# Patient Record
Sex: MALE | Race: WHITE | NOT HISPANIC OR LATINO | Employment: OTHER | URBAN - METROPOLITAN AREA
[De-identification: names, ages, dates, MRNs, and addresses within clinical notes are randomized per-mention and may not be internally consistent; named-entity substitution may affect disease eponyms.]

---

## 2019-04-22 NOTE — PATIENT DISCUSSION
Recommend 55units of Xeomin. Patient elects Xeomin injection. 45units discarded, * units used. (discussed risks and benefits Xeomin. .. ).

## 2019-05-02 NOTE — PATIENT DISCUSSION
This visual field clearly demonstrated a minimum of 49% loss of upper field of vision OU, with upper lid skin in repose and elevated by taping of the lid to demonstrate potential correction. This field shows that taping the lids significantly improved this patient's superior field of vision by approximately 40%, OU.

## 2019-06-17 NOTE — PROCEDURE NOTE: SURGICAL
"<div><p style=""tab-stops:-1.0in;""><strong>PREOPERATIVE DIAGNOSIS:</strong> <span>&nbsp;&nbsp;&nbsp;&nbsp;&nbsp;&nbsp;&nbsp;&nbsp;&nbsp;&nbsp;&nbsp;&nbsp;&nbsp; </span>1. Facial jowls. </p><p><span>&nbsp;&nbsp;&nbsp;&nbsp;&nbsp;&nbsp;&nbsp;&nbsp;&nbsp;&nbsp;&nbsp; </span><span>&nbsp;&nbsp;&nbsp;&nbsp;&nbsp;&nbsp;&nbsp;&nbsp;&nbsp;&nbsp;&nbsp;&nbsp;&nbsp;&nbsp;&nbsp;&nbsp;&nbsp;&nbsp;&nbsp;&nbsp;&nbsp;&nbsp;&nbsp;&nbsp;&nbsp;&nbsp;&nbsp;&nbsp;&nbsp;&nbsp;&nbsp;&nbsp;&nbsp;&nbsp;&nbsp;&nbsp;&nbsp;&nbsp;&nbsp;&nbsp;&nbsp;&nbsp;&nbsp;&nbsp;&nbsp;&nbsp;&nbsp;&nbsp;&nbsp;&nbsp;&nbsp;&nbsp;&nbsp; </span><span>&nbsp;&nbsp;&nbsp;&nbsp;&nbsp; </span>2. Facial rhytids. </p><p><span>&nbsp;&nbsp;&nbsp;&nbsp;&nbsp;&nbsp;&nbsp;&nbsp;&nbsp;&nbsp;&nbsp;&nbsp;&nbsp;&nbsp;&nbsp;&nbsp;&nbsp;&nbsp;&nbsp;&nbsp;&nbsp;&nbsp;&nbsp;&nbsp;&nbsp;&nbsp;&nbsp;&nbsp;&nbsp;&nbsp;&nbsp;&nbsp;&nbsp;&nbsp;&nbsp;&nbsp;&nbsp;&nbsp;&nbsp;&nbsp;&nbsp;&nbsp;&nbsp;&nbsp;&nbsp;&nbsp;&nbsp;&nbsp;&nbsp;&nbsp;&nbsp;&nbsp;&nbsp;&nbsp;&nbsp;&nbsp;&nbsp;&nbsp;&nbsp;&nbsp;&nbsp;&nbsp;&nbsp;&nbsp;&nbsp;&nbsp;&nbsp;&nbsp;&nbsp;&nbsp;&nbsp; </span>3. Platysmal bands</p><p><span>&nbsp;&nbsp;&nbsp;&nbsp;&nbsp;&nbsp;&nbsp;&nbsp;&nbsp;&nbsp;&nbsp;&nbsp;&nbsp;&nbsp;&nbsp;&nbsp;&nbsp;&nbsp;&nbsp;&nbsp;&nbsp;&nbsp;&nbsp;&nbsp;&nbsp;&nbsp;&nbsp;&nbsp;&nbsp;&nbsp;&nbsp;&nbsp;&nbsp;&nbsp;&nbsp;&nbsp;&nbsp;&nbsp;&nbsp;&nbsp;&nbsp;&nbsp;&nbsp;&nbsp;&nbsp;&nbsp;&nbsp;&nbsp;&nbsp;&nbsp;&nbsp;&nbsp;&nbsp;&nbsp;&nbsp;&nbsp;&nbsp;&nbsp;&nbsp;&nbsp;&nbsp;&nbsp;&nbsp;&nbsp;&nbsp;&nbsp;&nbsp;&nbsp;&nbsp;&nbsp;&nbsp; </span>4. Volume loss to cheeks</p><p><span>&nbsp;&nbsp;&nbsp;&nbsp;&nbsp;&nbsp;&nbsp;&nbsp;&nbsp;&nbsp;&nbsp;&nbsp;&nbsp;&nbsp;&nbsp;&nbsp;&nbsp;&nbsp;&nbsp;&nbsp;&nbsp;&nbsp;&nbsp;&nbsp;&nbsp;&nbsp;&nbsp;&nbsp;&nbsp;&nbsp;&nbsp;&nbsp;&nbsp;&nbsp;&nbsp;&nbsp;&nbsp;&nbsp;&nbsp;&nbsp;&nbsp;&nbsp;&nbsp;&nbsp;&nbsp;&nbsp;&nbsp;&nbsp;&nbsp;&nbsp;&nbsp;&nbsp;&nbsp;&nbsp;&nbsp;&nbsp;&nbsp;&nbsp;&nbsp;&nbsp;&nbsp;&nbsp;&nbsp;&nbsp;&nbsp;&nbsp;&nbsp;&nbsp;&nbsp;&nbsp;&nbsp; </span>5. Thinning lips with rhytids. </p><p><span>&nbsp;&nbsp;&nbsp;&nbsp;&nbsp;&nbsp;&nbsp;&nbsp;&nbsp;&nbsp;&nbsp;&nbsp;&nbsp;&nbsp;&nbsp;&nbsp;&nbsp;&nbsp;&nbsp;&nbsp;&nbsp;&nbsp;&nbsp;&nbsp;&nbsp;&nbsp;&nbsp;&nbsp;&nbsp;&nbsp;&nbsp;&nbsp;&nbsp;&nbsp;&nbsp;&nbsp;&nbsp;&nbsp;&nbsp;&nbsp;&nbsp;&nbsp;&nbsp;&nbsp;&nbsp;&nbsp;&nbsp;&nbsp;&nbsp;&nbsp;&nbsp;&nbsp;&nbsp;&nbsp;&nbsp;&nbsp;&nbsp;&nbsp;&nbsp;&nbsp;&nbsp;&nbsp;&nbsp;&nbsp;&nbsp;&nbsp;&nbsp;&nbsp;&nbsp;&nbsp;&nbsp; </span>6. Dermatochalasis upper lids. </p><p>&nbsp;</p><p><span>&nbsp;&nbsp;&nbsp;&nbsp;&nbsp;&nbsp;&nbsp;&nbsp;&nbsp;&nbsp;&nbsp;&nbsp;&nbsp;&nbsp;&nbsp;&nbsp;&nbsp;&nbsp;&nbsp;&nbsp;&nbsp;&nbsp;&nbsp;&nbsp;&nbsp;&nbsp;&nbsp;&nbsp;&nbsp;&nbsp;&nbsp;&nbsp;&nbsp;&nbsp;&nbsp;&nbsp;&nbsp;&nbsp;&nbsp;&nbsp;&nbsp;&nbsp;&nbsp;&nbsp;&nbsp;&nbsp;&nbsp;&nbsp;&nbsp;&nbsp;&nbsp;&nbsp;&nbsp;&nbsp;&nbsp;&nbsp;&nbsp;&nbsp;&nbsp;&nbsp;&nbsp;&nbsp;&nbsp;&nbsp;&nbsp;&nbsp;&nbsp;&nbsp;&nbsp;&nbsp;&nbsp; </span></p><p><span>&nbsp;&nbsp;&nbsp;&nbsp;&nbsp;&nbsp;&nbsp;&nbsp;&nbsp;&nbsp;&nbsp;&nbsp;&nbsp;&nbsp;&nbsp;&nbsp;&nbsp;&nbsp;&nbsp;&nbsp;&nbsp;&nbsp;&nbsp;&nbsp;&nbsp;&nbsp;&nbsp;&nbsp;&nbsp;&nbsp;&nbsp;&nbsp;&nbsp;&nbsp;&nbsp;&nbsp;&nbsp;&nbsp;&nbsp;&nbsp;&nbsp;&nbsp;&nbsp;&nbsp;&nbsp;&nbsp;&nbsp;&nbsp;&nbsp;&nbsp;&nbsp;&nbsp;&nbsp;&nbsp;&nbsp;&nbsp;&nbsp;&nbsp;&nbsp;&nbsp;&nbsp;&nbsp;&nbsp;&nbsp;&nbsp;&nbsp;&nbsp;&nbsp;&nbsp;&nbsp;&nbsp; </span></p><p><strong>POSTOPERATIVE DIAGNOSIS</strong>: <span>&nbsp;&nbsp;&nbsp;&nbsp;&nbsp;&nbsp;&nbsp;&nbsp;&nbsp;&nbsp;&nbsp;&nbsp;&nbsp;&nbsp;&nbsp;&nbsp;&nbsp;&nbsp;&nbsp;&nbsp;&nbsp;&nbsp;&nbsp; </span><span>&nbsp;</span>Same<span>&nbsp;&nbsp;&nbsp;&nbsp;&nbsp;&nbsp;&nbsp;&nbsp;&nbsp;&nbsp;&nbsp;&nbsp;&nbsp;&nbsp;&nbsp;&nbsp;&nbsp;&nbsp;&nbsp;&nbsp;&nbsp;&nbsp;&nbsp;&nbsp;&nbsp;&nbsp;&nbsp;&nbsp;&nbsp;&nbsp;&nbsp;&nbsp;&nbsp;&nbsp;&nbsp;&nbsp;&nbsp;&nbsp;&nbsp;&nbsp;&nbsp;&nbsp;&nbsp;&nbsp;&nbsp;&nbsp;&nbsp;&nbsp;&nbsp;&nbsp;&nbsp;&nbsp;&nbsp;&nbsp;&nbsp;&nbsp;&nbsp;&nbsp;&nbsp;&nbsp;&nbsp; </span></p><p>&nbsp;</p><p style=""tab-stops:-1.0in;""><strong>PROCEDURE:</strong> <span>&nbsp;&nbsp;&nbsp;&nbsp;&nbsp;&nbsp;&nbsp;&nbsp;&nbsp;&nbsp;&nbsp;&nbsp;&nbsp;&nbsp;&nbsp;&nbsp;&nbsp;&nbsp;&nbsp;&nbsp;&nbsp;&nbsp;&nbsp;&nbsp;&nbsp;&nbsp;&nbsp;&nbsp;&nbsp;&nbsp;&nbsp;&nbsp;&nbsp;&nbsp;&nbsp;&nbsp;&nbsp;&nbsp;&nbsp;&nbsp;&nbsp;&nbsp;&nbsp; </span>1. Lower Facelift with Deep Plane sub- SMAS </p><p style=""tab-stops:-1.0in;""><span>&nbsp;&nbsp;&nbsp;&nbsp;&nbsp;&nbsp;&nbsp;&nbsp;&nbsp;&nbsp;&nbsp;&nbsp;&nbsp;&nbsp;&nbsp;&nbsp;&nbsp;&nbsp;&nbsp;&nbsp;&nbsp;&nbsp;&nbsp;&nbsp;&nbsp;&nbsp;&nbsp;&nbsp;&nbsp;&nbsp;&nbsp;&nbsp;&nbsp;&nbsp;&nbsp;&nbsp;&nbsp;&nbsp;&nbsp;&nbsp;&nbsp;&nbsp;&nbsp;&nbsp;&nbsp;&nbsp;&nbsp;&nbsp;&nbsp;&nbsp;&nbsp;&nbsp;&nbsp;&nbsp;&nbsp;&nbsp;&nbsp;&nbsp;&nbsp;&nbsp;&nbsp;&nbsp;&nbsp;&nbsp;&nbsp;&nbsp;&nbsp;&nbsp;&nbsp;&nbsp;&nbsp; </span><span>&nbsp;&nbsp; </span>approach. </p><p style=""tab-stops:-1.0in;""><span>&nbsp;&nbsp;&nbsp;&nbsp;&nbsp;&nbsp;&nbsp;&nbsp;&nbsp;&nbsp;&nbsp;&nbsp;&nbsp;&nbsp;&nbsp;&nbsp;&nbsp;&nbsp;&nbsp;&nbsp;&nbsp;&nbsp;&nbsp;&nbsp;&nbsp;&nbsp;&nbsp;&nbsp;&nbsp;&nbsp;&nbsp;&nbsp;&nbsp;&nbsp;&nbsp;&nbsp;&nbsp;&nbsp;&nbsp;&nbsp;&nbsp;&nbsp;&nbsp;&nbsp;&nbsp;&nbsp;&nbsp;&nbsp;&nbsp;&nbsp;&nbsp;&nbsp;&nbsp;&nbsp;&nbsp;&nbsp;&nbsp;&nbsp;&nbsp;&nbsp;&nbsp;&nbsp;&nbsp;&nbsp;&nbsp;&nbsp;&nbsp;&nbsp;&nbsp;&nbsp;&nbsp; </span>2. Platysmaplasty. </p><p style=""margin-left:3.0in;tab-stops:-1.0in;"">3. 1cc of Volbella injection to lips</p><p style=""margin-left:3.0in;tab-stops:-1.0in;"">4. 2cc&rsquo;s of Juvederm ultra injection to lips</p><p style=""margin-left:3.0in;tab-stops:-1.0in;"">5. 2cc&rsquo;s of Voluma injection to cheeks</p><p style=""margin-left:3.0in;tab-stops:-1.0in;"">6.  Upper lid blepharoplasty both eyes</p><p style=""margin-left:3.0in;tab-stops:-1.0in;"">&nbsp;</p><p style=""tab-stops:-1.0in;""><span>&nbsp;&nbsp;&nbsp;&nbsp;&nbsp;&nbsp;&nbsp;&nbsp;&nbsp;&nbsp;&nbsp;&nbsp;&nbsp;&nbsp;&nbsp;&nbsp;&nbsp;&nbsp;&nbsp;&nbsp;&nbsp;&nbsp;&nbsp;&nbsp;&nbsp;&nbsp;&nbsp;&nbsp;&nbsp;&nbsp;&nbsp;&nbsp;&nbsp;&nbsp;&nbsp;&nbsp;&nbsp;&nbsp;&nbsp;&nbsp;&nbsp;&nbsp;&nbsp;&nbsp;&nbsp;&nbsp;&nbsp;&nbsp;&nbsp;&nbsp;&nbsp;&nbsp;&nbsp;&nbsp;&nbsp;&nbsp;&nbsp;&nbsp;&nbsp;&nbsp;&nbsp;&nbsp;&nbsp;&nbsp;&nbsp;&nbsp;&nbsp;&nbsp;&nbsp;&nbsp;&nbsp;&nbsp;&nbsp;&nbsp;&nbsp;&nbsp;&nbsp;&nbsp;&nbsp;&nbsp;&nbsp;&nbsp;&nbsp;&nbsp;&nbsp;&nbsp;&nbsp;&nbsp;&nbsp;&nbsp;&nbsp;&nbsp;&nbsp;&nbsp;&nbsp;&nbsp;&nbsp;&nbsp;&nbsp;&nbsp;&nbsp;&nbsp;&nbsp;&nbsp;&nbsp;&nbsp;&nbsp;&nbsp;&nbsp;&nbsp;&nbsp;&nbsp;&nbsp;&nbsp;&nbsp;&nbsp;&nbsp;&nbsp;&nbsp;&nbsp;&nbsp;&nbsp;&nbsp;&nbsp;&nbsp;&nbsp;&nbsp;&nbsp;&nbsp;&nbsp;&nbsp;&nbsp;&nbsp;&nbsp;&nbsp;&nbsp;&nbsp;&nbsp;&nbsp;&nbsp;&nbsp;&nbsp;&nbsp; </span></p><p style=""margin-left:5.0in;text-indent:-5.0in;tab-stops:-1.0in;""><strong>SURGEON:</strong><span>&nbsp;&nbsp;&nbsp;&nbsp;&nbsp;&nbsp;&nbsp;&nbsp;&nbsp;&nbsp;&nbsp;&nbsp;&nbsp;&nbsp;&nbsp;&nbsp;&nbsp;&nbsp;&nbsp;&nbsp;&nbsp;&nbsp;&nbsp;&nbsp;&nbsp;&nbsp;&nbsp;&nbsp;&nbsp;&nbsp;&nbsp;&nbsp;&nbsp;&nbsp;&nbsp;&nbsp;&nbsp;&nbsp;&nbsp;&nbsp;&nbsp;&nbsp;&nbsp;&nbsp;&nbsp;&nbsp;&nbsp;&nbsp;&nbsp;&nbsp; </span>Librado Benz

## 2019-06-24 NOTE — PATIENT DISCUSSION
skinuva Tizo, pt wants to leave [facility] gave pt instructions needs to see a Dr to get 6 interupted sutures out. Dr David Johnson or Dr Valorie Ryan.

## 2019-06-27 NOTE — PATIENT DISCUSSION
skinuva Tizo, pt wants to leave town gave pt instructions needs to see a Dr to get 6 interupted sutures out. Dr Yessenia Ty or Dr Zuly Weldon.

## 2019-06-27 NOTE — PATIENT DISCUSSION
Small area of dehiscence behind right ear. This area will heal on its own. There is no need to suture this area today.

## 2019-07-01 NOTE — PATIENT DISCUSSION
script of bactraban TID apply to wounds written today 7/1/19and amoxicillin bid x 2 weeks start today 7/1/19.

## 2019-07-01 NOTE — PATIENT DISCUSSION
skinuva Tizo, pt wants to leave [facility] gave pt instructions needs to see a Dr to get 6 interupted sutures out. Dr Nesha Cox or Dr Gwendolyn Cruz.

## 2019-10-16 NOTE — PATIENT DISCUSSION
skinuva Tizo, pt wants to leave town gave pt instructions needs to see a Dr to get 6 interupted sutures out. Dr Isaiah Gross or Dr Carola Mari.

## 2019-10-16 NOTE — PATIENT DISCUSSION
Recommend 55 units of Xeomin. Patient elects Xeomin injection. 45units discarded, 55 units used. (discussed risks and benefits Xeomin. ..). Lot # [id number], exp. 09/2021.

## 2019-12-11 NOTE — PATIENT DISCUSSION
Recommend 55 units of Xeomin. Patient elects Xeomin injection. 45units discarded, 55 units used. (discussed risks and benefits Xeomin. ..)[facility]#[id number] Exp: 09/21.

## 2019-12-11 NOTE — PATIENT DISCUSSION
Recommend 55 units of Xeomin. Patient elects Xeomin injection. 45units discarded, 55 units used. (discussed risks and benefits Xeomin. ..). Lot # [id number], exp. 09/2021.

## 2019-12-18 NOTE — PATIENT DISCUSSION
skinuva Tizo, pt wants to leave [facility] gave pt instructions needs to see a Dr to get 6 interupted sutures out. Dr Hans Mcfadden or Dr Mark Ibrahim.

## 2019-12-18 NOTE — PATIENT DISCUSSION
Recommend 55 units of Xeomin. Patient elects Xeomin injection. 45units discarded, 55 units used. (discussed risks and benefits Xeomin. ..). Lot # [id number], exp. 09/2021.

## 2019-12-18 NOTE — PATIENT DISCUSSION
Recommend 5 units of Botox. Patient elects Botox injection. 95units discarded, 5 units used. (discussed risks and benefits of BOTOX. ..) N/C per [facility].

## 2019-12-18 NOTE — PATIENT DISCUSSION
Recommend 55 units of Xeomin. Patient elects Xeomin injection. 45units discarded, 55 units used. (discussed risks and benefits Xeomin. ..)PMD#[id number] Exp: 09/21.

## 2020-05-13 NOTE — PATIENT DISCUSSION
skinuva Tizo, pt wants to leave town gave pt instructions needs to see a Dr to get 6 interupted sutures out. Dr Gideon Garcia or Dr Ban Avery.

## 2020-05-13 NOTE — PATIENT DISCUSSION
Recommend 50 units of Botox. Patient elects Botox injection. 50units discarded, 50 units used. (discussed risks and benefits of BOTOX. ..) [id number] 8/22 injected 5/13/2020.

## 2020-05-13 NOTE — PATIENT DISCUSSION
Recommend 55 units of Xeomin. Patient elects Xeomin injection. 45units discarded, 55 units used. (discussed risks and benefits Xeomin. ..). Lot # [id number], exp. 09/2021.

## 2020-05-26 NOTE — PATIENT DISCUSSION
Recommend 55 units of Xeomin. Patient elects Xeomin injection. 45units discarded, 55 units used. (discussed risks and benefits Xeomin. ..). Lot # [id number], exp. 09/2021.

## 2020-05-26 NOTE — PATIENT DISCUSSION
Minimal risk for malignant transformation discussed with patient. Recommend to observe and follow with serial exams.  photo taken today.

## 2020-05-26 NOTE — PATIENT DISCUSSION
Recommend 50 units of Botox. Patient elects Botox injection. 50units discarded, 50 units used. (discussed risks and benefits of BOTOX. ..) [id number] 8/22 injected 5/13/2020.

## 2020-05-26 NOTE — PATIENT DISCUSSION
Recommend 55 units of Xeomin. Patient elects Xeomin injection. 45units discarded, 55 units used. (discussed risks and benefits Xeomin. ..). Lot # [id number], exp. 09/2021.

## 2020-05-26 NOTE — PATIENT DISCUSSION
Recommend 50 units of Botox. Patient elects Botox injection. 50units discarded, 50 units used. (discussed risks and benefits of BOTOX. ..) [id number] 8/22 injected S/P 5/13/2020.

## 2020-05-26 NOTE — PATIENT DISCUSSION
ed CTL fit will do I and R refresher course at dispense.  ed CTL vision will not be equal to glasses.

## 2020-05-26 NOTE — PATIENT DISCUSSION
skinuva Tizo, pt wants to leave town gave pt instructions needs to see a Dr to get 6 interupted sutures out. Dr Brian Salas or Dr Elva Pinto.

## 2020-05-26 NOTE — PATIENT DISCUSSION
skinuva Tizo, pt wants to leave [facility] gave pt instructions needs to see a Dr to get 6 interupted sutures out. Dr Julia Latif or Dr Tal Pablo.

## 2020-11-04 ENCOUNTER — NEW PATIENT COMPREHENSIVE (OUTPATIENT)
Dept: URBAN - METROPOLITAN AREA CLINIC 36 | Facility: CLINIC | Age: 65
End: 2020-11-04

## 2020-11-04 DIAGNOSIS — H25.812: ICD-10-CM

## 2020-11-04 DIAGNOSIS — H25.811: ICD-10-CM

## 2020-11-04 DIAGNOSIS — H52.7: ICD-10-CM

## 2020-11-04 PROCEDURE — 92015 DETERMINE REFRACTIVE STATE: CPT

## 2020-11-04 PROCEDURE — 92004 COMPRE OPH EXAM NEW PT 1/>: CPT

## 2020-11-04 ASSESSMENT — TONOMETRY
OS_IOP_MMHG: 12
OD_IOP_MMHG: 12

## 2020-11-04 ASSESSMENT — VISUAL ACUITY
OS_SC: 20/40-2
OS_SC: J12
OD_SC: J12
OD_PH: 20/30-2
OS_PH: 20/40+2
OD_SC: 20/40+1

## 2021-11-01 ENCOUNTER — ESTABLISHED COMPREHENSIVE EXAM (OUTPATIENT)
Dept: URBAN - METROPOLITAN AREA CLINIC 36 | Facility: CLINIC | Age: 66
End: 2021-11-01

## 2021-11-01 DIAGNOSIS — H25.811: ICD-10-CM

## 2021-11-01 DIAGNOSIS — H52.7: ICD-10-CM

## 2021-11-01 DIAGNOSIS — H25.812: ICD-10-CM

## 2021-11-01 PROCEDURE — 92014 COMPRE OPH EXAM EST PT 1/>: CPT

## 2021-11-01 PROCEDURE — 92015 DETERMINE REFRACTIVE STATE: CPT

## 2021-11-01 ASSESSMENT — TONOMETRY
OD_IOP_MMHG: 14
OS_IOP_MMHG: 15

## 2021-11-01 ASSESSMENT — VISUAL ACUITY
OD_SC: 20/25
OS_CC: J1+
OD_CC: J1+
OD_CC: 20/20-1
OD_SC: J3
OS_SC: J2
OS_SC: 20/25
OS_CC: 20/20-1

## 2021-11-10 NOTE — PATIENT DISCUSSION
Recommend 55 units of Xeomin. Patient elects Xeomin injection. 45units discarded, 55 units used. (discussed risks and benefits Xeomin. ..). Lot # [id number], exp. 09/2021. 98.4

## 2021-11-10 NOTE — PATIENT DISCUSSION
injected 11/10/21 Recommend 64 units of Botox. Patient elects Botox injection. 36units discarded, 64 units used. (discussed risks and benefits of BOTOX. ..) lot [id number], ext 1/2024.  injected 11/10/21 pt is a long standing pt with [facility] Per [facility] $50.00 off today.

## 2021-11-10 NOTE — PATIENT DISCUSSION
[facility]PF could go back to OR and N/C  conservative scar removal. Chances pt might have same reaction to hypopigmentation. pt defers for now.

## 2022-04-12 ENCOUNTER — EMERGENCY VISIT (OUTPATIENT)
Dept: URBAN - METROPOLITAN AREA CLINIC 47 | Facility: CLINIC | Age: 67
End: 2022-04-12

## 2022-04-12 DIAGNOSIS — H25.813: ICD-10-CM

## 2022-04-12 DIAGNOSIS — H43.393: ICD-10-CM

## 2022-04-12 PROCEDURE — 92012 INTRM OPH EXAM EST PATIENT: CPT

## 2022-04-12 ASSESSMENT — TONOMETRY
OD_IOP_MMHG: 14
OS_IOP_MMHG: 14

## 2022-04-12 ASSESSMENT — VISUAL ACUITY
OD_SC: 20/20
OS_SC: 20/20-2

## 2022-04-12 NOTE — PATIENT DISCUSSION
Recommend Bilateral upper lid blepharoplasty. predeterm(discussed risks and benefits of sx. .. ). no

## 2022-11-09 ENCOUNTER — COMPREHENSIVE EXAM (OUTPATIENT)
Dept: URBAN - METROPOLITAN AREA CLINIC 36 | Facility: CLINIC | Age: 67
End: 2022-11-09

## 2022-11-09 DIAGNOSIS — H52.7: ICD-10-CM

## 2022-11-09 DIAGNOSIS — H25.813: ICD-10-CM

## 2022-11-09 PROCEDURE — 92014 COMPRE OPH EXAM EST PT 1/>: CPT

## 2022-11-09 PROCEDURE — 92015 DETERMINE REFRACTIVE STATE: CPT

## 2022-11-09 ASSESSMENT — VISUAL ACUITY
OD_SC: 20/25-1
OS_SC: 20/30-1
OS_SC: J3
OD_SC: J5

## 2022-11-09 ASSESSMENT — TONOMETRY
OS_IOP_MMHG: 14
OD_IOP_MMHG: 12

## 2022-12-23 NOTE — PATIENT DISCUSSION
Patient transported to [address], RN  12/23/22 0209 Recommend  Vobella 1cc and Juvederm  Ultra 2cc (discussed risks and benefits. .. ).

## 2023-06-18 NOTE — PATIENT DISCUSSION
This visual field clearly demonstrated a minimum of 49% loss of upper field of vision OU, with upper lid skin in repose and elevated by taping of the lid to demonstrate potential correction. This field shows that taping the lids significantly improved this patient's superior field of vision by approximately 40%, OU. No

## 2023-11-09 ENCOUNTER — COMPREHENSIVE EXAM (OUTPATIENT)
Dept: URBAN - METROPOLITAN AREA CLINIC 36 | Facility: CLINIC | Age: 68
End: 2023-11-09

## 2023-11-09 DIAGNOSIS — H04.123: ICD-10-CM

## 2023-11-09 DIAGNOSIS — H25.813: ICD-10-CM

## 2023-11-09 PROCEDURE — 92014 COMPRE OPH EXAM EST PT 1/>: CPT

## 2023-11-09 PROCEDURE — 92015 DETERMINE REFRACTIVE STATE: CPT

## 2023-11-09 ASSESSMENT — VISUAL ACUITY
OS_CC: J1+
OS_SC: J3
OD_CC: J1+
OD_CC: 20/20
OD_SC: J6
OS_SC: 20/25
OS_CC: 20/20
OD_SC: 20/20-1

## 2023-11-09 ASSESSMENT — TONOMETRY
OS_IOP_MMHG: 12
OD_IOP_MMHG: 12

## 2024-12-06 ENCOUNTER — COMPREHENSIVE EXAM (OUTPATIENT)
Age: 69
End: 2024-12-06

## 2024-12-06 DIAGNOSIS — H04.123: ICD-10-CM

## 2024-12-06 DIAGNOSIS — H25.813: ICD-10-CM

## 2024-12-06 PROCEDURE — 99213 OFFICE O/P EST LOW 20 MIN: CPT

## 2025-01-03 ENCOUNTER — CONTACT LENSES/GLASSES VISIT (OUTPATIENT)
Age: 70
End: 2025-01-03

## 2025-01-03 DIAGNOSIS — H25.813: ICD-10-CM

## 2025-01-03 DIAGNOSIS — H04.123: ICD-10-CM

## 2025-01-03 DIAGNOSIS — H52.7: ICD-10-CM

## 2025-01-03 PROCEDURE — 92015GRNC REFRACTION GLASSES RECHECK NO CHARGE

## 2025-07-02 NOTE — PATIENT DISCUSSION
Called patients mother and made her aware we will need a new form signed as they are good for 1 year from the date of signing. She states that she will come in on Thursdat July 3rd to fill out the form for the patient's grandmother to bring her to the appointment on Monday.    Lids in good position.